# Patient Record
Sex: FEMALE | Race: WHITE | NOT HISPANIC OR LATINO | Employment: FULL TIME | URBAN - METROPOLITAN AREA
[De-identification: names, ages, dates, MRNs, and addresses within clinical notes are randomized per-mention and may not be internally consistent; named-entity substitution may affect disease eponyms.]

---

## 2017-08-24 PROBLEM — N18.30 CKD (CHRONIC KIDNEY DISEASE) STAGE 3, GFR 30-59 ML/MIN: Status: ACTIVE | Noted: 2017-08-24

## 2017-08-28 PROBLEM — N18.2 CKD (CHRONIC KIDNEY DISEASE) STAGE 2, GFR 60-89 ML/MIN: Status: RESOLVED | Noted: 2017-08-28 | Resolved: 2017-08-28

## 2017-08-28 PROBLEM — N18.30 CKD (CHRONIC KIDNEY DISEASE) STAGE 3, GFR 30-59 ML/MIN: Status: RESOLVED | Noted: 2017-08-24 | Resolved: 2017-08-28

## 2017-08-28 PROBLEM — N18.2 CKD (CHRONIC KIDNEY DISEASE) STAGE 2, GFR 60-89 ML/MIN: Status: ACTIVE | Noted: 2017-08-28

## 2017-08-28 PROBLEM — M19.90 OSTEOARTHRITIS: Status: RESOLVED | Noted: 2017-08-28 | Resolved: 2017-08-28

## 2017-08-28 PROBLEM — M19.90 OSTEOARTHRITIS: Status: ACTIVE | Noted: 2017-08-28

## 2017-09-18 PROBLEM — N18.2 CKD (CHRONIC KIDNEY DISEASE) STAGE 2, GFR 60-89 ML/MIN: Status: RESOLVED | Noted: 2017-08-28 | Resolved: 2017-09-18

## 2019-08-22 ENCOUNTER — OFFICE VISIT (OUTPATIENT)
Dept: GASTROENTEROLOGY | Facility: CLINIC | Age: 49
End: 2019-08-22
Payer: COMMERCIAL

## 2019-08-22 VITALS
DIASTOLIC BLOOD PRESSURE: 75 MMHG | WEIGHT: 152.75 LBS | BODY MASS INDEX: 24.66 KG/M2 | SYSTOLIC BLOOD PRESSURE: 118 MMHG | HEART RATE: 82 BPM

## 2019-08-22 DIAGNOSIS — R10.9 ABDOMINAL PAIN, UNSPECIFIED ABDOMINAL LOCATION: Primary | ICD-10-CM

## 2019-08-22 DIAGNOSIS — R14.0 BLOATING: ICD-10-CM

## 2019-08-22 PROCEDURE — 99999 PR PBB SHADOW E&M-EST. PATIENT-LVL III: CPT | Mod: PBBFAC,,, | Performed by: INTERNAL MEDICINE

## 2019-08-22 PROCEDURE — 99999 PR PBB SHADOW E&M-EST. PATIENT-LVL III: ICD-10-PCS | Mod: PBBFAC,,, | Performed by: INTERNAL MEDICINE

## 2019-08-22 PROCEDURE — 99205 OFFICE O/P NEW HI 60 MIN: CPT | Mod: S$GLB,,, | Performed by: INTERNAL MEDICINE

## 2019-08-22 PROCEDURE — 3008F PR BODY MASS INDEX (BMI) DOCUMENTED: ICD-10-PCS | Mod: CPTII,S$GLB,, | Performed by: INTERNAL MEDICINE

## 2019-08-22 PROCEDURE — 3008F BODY MASS INDEX DOCD: CPT | Mod: CPTII,S$GLB,, | Performed by: INTERNAL MEDICINE

## 2019-08-22 PROCEDURE — 99205 PR OFFICE/OUTPT VISIT, NEW, LEVL V, 60-74 MIN: ICD-10-PCS | Mod: S$GLB,,, | Performed by: INTERNAL MEDICINE

## 2019-08-22 RX ORDER — HYOSCYAMINE SULFATE 0.125 MG
125 TABLET ORAL EVERY 6 HOURS PRN
Qty: 90 TABLET | Refills: 6 | Status: SHIPPED | OUTPATIENT
Start: 2019-08-22 | End: 2020-09-25

## 2019-08-22 RX ORDER — SODIUM, POTASSIUM,MAG SULFATES 17.5-3.13G
1 SOLUTION, RECONSTITUTED, ORAL ORAL DAILY
Qty: 1 KIT | Refills: 0 | Status: SHIPPED | OUTPATIENT
Start: 2019-08-22 | End: 2019-08-24

## 2019-08-22 NOTE — PROGRESS NOTES
Subjective:       Patient ID: Emma Plascencia is a 49 y.o. female.    Chief Complaint: Abdominal Pain (left side since May)    For today for evaluation of abdominal pain. Pain is characterized as intermittent left lower quadrant pain that radiates to her left flank.  Is associated with bloating.  It occurs frequently, some weeks once or twice while skipping other weeks.  Seems to occur in the morning or after eating particularly gas causing foods.  May also occur during exercise.  She is evaluated by an outside physician with ultrasound which was reportedly normal.  She denies change in bowel habits however complaint do improve with bowel movement.  Denies blood in her stool.  Denies unexplained weight loss.  Denies family history of colon cancer.      Denies prior surgeries      Past Medical History:   Diagnosis Date    CKD (chronic kidney disease) stage 2, GFR 60-89 ml/min     no kidney stones    Hematuria     Osteoarthritis of knee     Plantar fasciitis     PONV (postoperative nausea and vomiting)     Proteinuria        Past Surgical History:   Procedure Laterality Date    ARTHROSCOPY-MENISCECTOMY partial lateral Right 9/3/2015    [facility] OR    knee Right     3 years ago    KNEE ARTHROSCOPY W/ MENISCAL REPAIR      LIP REPAIR      RECONSTRUCTION, ACL WITH AUTOGRAFT of hamstring Right 9/3/2015    Performed by [facility] at [facility] OR    SHOULDER SURGERY      WISDOM TOOTH EXTRACTION         Social History  Social History     Tobacco Use    Smoking status: Never Smoker    Smokeless tobacco: Never Used   Substance Use Topics    Alcohol use: Yes     Comment: social    Drug use: Yes     Types: Marijuana     Comment: 25 years ago       Family History   Problem Relation Age of Onset    Diabetes Mother     Hypertension Father     Stroke Father        Review of Systems   Constitutional: Negative for chills, fever and unexpected weight change.   HENT:  Negative for congestion and trouble swallowing.    Eyes: Negative for photophobia and visual disturbance.   Respiratory: Negative for cough and shortness of breath.    Cardiovascular: Negative for chest pain and leg swelling.   Gastrointestinal: Positive for abdominal distention and abdominal pain. Negative for blood in stool, constipation, diarrhea, nausea and vomiting.   Genitourinary: Negative for dysuria and hematuria.   Musculoskeletal: Negative for arthralgias and myalgias.   Skin: Negative for color change and rash.   Neurological: Negative for dizziness, light-headedness and numbness.   Psychiatric/Behavioral: Negative for agitation and confusion.           Objective:      Physical Exam   Constitutional: She is oriented to person, place, and time. She appears well-developed and well-nourished.   HENT:   Head: Normocephalic and atraumatic.   Eyes: Pupils are equal, round, and reactive to light. EOM are normal. No scleral icterus.   Neck: Normal range of motion. Neck supple.   Cardiovascular: Normal rate, regular rhythm, normal heart sounds and intact distal pulses.   Pulmonary/Chest: Effort normal and breath sounds normal. No respiratory distress.   Abdominal: Soft. Bowel sounds are normal. She exhibits no distension. There is no tenderness.   Musculoskeletal: Normal range of motion. She exhibits no edema.   Neurological: She is alert and oriented to person, place, and time.   No asterixis   Skin: Skin is warm and dry. No rash noted. No erythema.   Psychiatric: She has a normal mood and affect. Her behavior is normal.   Nursing note and vitals reviewed.        Pertinent labs and imaging studies reviewed    Assessment:       1. Abdominal pain, unspecified abdominal location    2. Bloating        Plan:       Complaints likely functional in origin.  Patient offered colonoscopy however she would like to wait at this time.  I feel like this is reasonable as patient does not have any red flag symptoms that would  make me push for urgent endoscopy   Patient counseled to keep food diary and to avoid potentially inciting foods.  Patient counseled on low FODMAP diet  Trial of Beano.  His symptoms to severe patient provided prescription for Levsin  If no improvement patient is instructed to call clinic and have a colonoscopy scheduled    (Portions of this note were dictated using voice recognition software and may contain dictation related errors in spelling/grammar/syntax not found on text review)

## 2019-12-05 ENCOUNTER — PATIENT MESSAGE (OUTPATIENT)
Dept: GASTROENTEROLOGY | Facility: CLINIC | Age: 49
End: 2019-12-05

## 2019-12-05 ENCOUNTER — TELEPHONE (OUTPATIENT)
Dept: GASTROENTEROLOGY | Facility: CLINIC | Age: 49
End: 2019-12-05

## 2019-12-05 NOTE — TELEPHONE ENCOUNTER
SUPREP Instructions    You are scheduled for a colonoscopy with Dr. Moran  on 1/27/20  at [facility]  To ensure that your test is accurate and complete, you MUST follow these instructions listed below.  If you have any questions, please call our office at [phone].  Plan on being at the hospital for your procedure for 3-4 hours.    1.  Follow a CLEAR LIQUID DIET for the entire day before your scheduled colonoscopy.  This means no solid food the entire day starting when you wake.  You may have as much of the clear liquids as you want throughout the day.   CLEAR LIQUID DIET:   - Avoid Red, Orange, Purple, and/or Blue food coloring   - NO DAIRY   - You can have:  Coffee with sugar (no creamer), tea, water, soda, apple or white grape juice, chicken or beef broth/bouillon (no meat, noodles, or veggies), green/yellow popsicles, green/yellow Jell-O, lemonade.    2.  AT 5 pm the evening before your colonoscopy, POUR ONE (1) BOTTLE OF SUPREP INTO THE MIXING CONTAINER, PROVIDED INSIDE THE BOX.  ADD WATER TO THE LINE ON THE CONTAINER AND MIX IT WELL.  DRINK THE ENTIRE CONTAINER AND THEN DRINK TWO (2) MORE CONTAINERS OF WATER OVER THE NEXT 1 HOUR.  This is sometimes easier to drink if this solution is cold, so you can mix the solution 20 minutes ahead of time and place in the refrigerator prior to drinking.  You have to drink the solution within 30-45 minutes of mixing it.  Do NOT put this solution over ice.  It IS ok to drink with a straw.    3.  The endoscopy department will call you 1 day before your colonoscopy to tell you the exact time to arrive, AND to tell you the exact time to drink the 2nd portion of your prep (which will be FIVE HOURS BEFORE YOUR ARRIVAL TIME).  At this time given to you, POUR ONE (1) BOTTLE OF SUPREP INTO THE MIXING CONTAINER, PROVIDED INSIDE THE BOX.  ADD WATER TO THE LINE ON THE CONTAINER AND MIX IT WELL.  DRINK THE ENTIRE CONTAINER AND THEN DRINK TWO (2) MORE CONTAINERS OF WATER OVER  THE NEXT 1 HOUR.  This is sometimes easier to drink if this solution is cold, so you can mix the solution 20 minutes ahead of time and place in the refrigerator prior to drinking.  You have to drink the solution within 30-45 minutes of mixing it.  Do NOT put this solution over ice.  It IS ok to drink with a straw.  Once this is complete, you may not have ANYTHING else by mouth!    4.  You must have someone with you to DRIVE YOU HOME since you will be receiving IV sedation for the colonoscopy.    5.  It is ok to take your heart, blood pressure, and seizure medications in the morning of your test with a SIP of water.  Hold other medications until after your procedure.  Do NOT have anything else to eat or drink the morning of your colonoscopy.  It is ok to brush your teeth.    6.  If you are on blood thinners THAT YOU HAVE BEEN INSTRUCTED TO HOLD BY YOUR DOCTOR FOR THIS PROCEDURE, then do NOT take this the morning of your colonoscopy.  Do NOT stop these medications on your own, they must be approved to be held by your doctor.  Your colonoscopy can NOT be done if you are on these medications.  Examples of blood thinners include: Coumadin, Aggrenox, Plavix, Pradaxa, Reapro, Pletal, Xarelto, Ticagrelor, Brilinta, Eliquis, and high dose aspirin (325 mg).  You do not have to stop baby aspirin 81 mg.    7.  IF YOU ARE DIABETIC:  NO INSULIN OR ORAL MEDICATIONS THE MORNING OF THE COLONOSCOPY.  TAKE ONLY HALF THE DOSE OF YOUR INSULIN THE DAY BEFORE THE COLONOSCOPY.  DO NOT TAKE ANY ORAL DIABETIC MEDICATIONS THE DAY BEFORE THE COLONOSCOPY.  IF YOU ARE AN INSULIN DEPENDENT DIABETIC WITH UNSTABLE BLOOD SUGARS, NOTIFY YOUR PRIMARY CARE PHYSICIAN FOR INSTRUCTIONS.

## 2020-01-23 ENCOUNTER — TELEPHONE (OUTPATIENT)
Dept: ENDOSCOPY | Facility: HOSPITAL | Age: 50
End: 2020-01-23

## 2020-01-23 NOTE — TELEPHONE ENCOUNTER
Left message instructing patient to call dept @ [phone] between 8am-4pm.    Arrival time to be given @ 11:30 *  (Message sent via [facility] portal)

## 2020-01-23 NOTE — TELEPHONE ENCOUNTER
Spoke with patient about arrival time @1130.     Prep instructions reviewed: the day before the procedure, follow a clear liquid diet all day, then start the first 1/2 of prep at 5pm and take 2nd 1/2 of prep @0630.  Pt must be completely NPO when prep completed @0830.              Medications: Do not take Insulin or oral diabetic medications the day of the procedure.  Take as prescribed: heart, seizure and blood pressure medication in the morning with a sip of water (less than an ounce).  Take any breathing medications and bring inhalers to hospital with you Leave all valuables and jewelry at home.     Wear comfortable clothes to procedure to change into hospital gown You cannot drive for 24 hours after your procedure because you will receive sedation for your procedure to make you comfortable.  A ride must be provided at discharge.

## 2020-01-27 ENCOUNTER — HOSPITAL ENCOUNTER (OUTPATIENT)
Facility: HOSPITAL | Age: 50
Discharge: HOME | End: 2020-01-27
Attending: INTERNAL MEDICINE | Admitting: INTERNAL MEDICINE
Payer: COMMERCIAL

## 2020-01-27 ENCOUNTER — ANESTHESIA (OUTPATIENT)
Dept: ENDOSCOPY | Facility: HOSPITAL | Age: 50
End: 2020-01-27
Payer: COMMERCIAL

## 2020-01-27 ENCOUNTER — ANESTHESIA EVENT (OUTPATIENT)
Dept: ENDOSCOPY | Facility: HOSPITAL | Age: 50
End: 2020-01-27
Payer: COMMERCIAL

## 2020-01-27 DIAGNOSIS — Z12.11 COLON CANCER SCREENING: Primary | ICD-10-CM

## 2020-01-27 PROCEDURE — G0121 COLON CA SCRN NOT HI RSK IND: ICD-10-PCS | Mod: ,,, | Performed by: INTERNAL MEDICINE

## 2020-01-27 PROCEDURE — 37000009 HC ANESTHESIA EA ADD 15 MINS: Performed by: INTERNAL MEDICINE

## 2020-01-27 PROCEDURE — 63600175 PHARM REV CODE 636 W HCPCS: Performed by: NURSE ANESTHETIST, CERTIFIED REGISTERED

## 2020-01-27 PROCEDURE — G0121 COLON CA SCRN NOT HI RSK IND: HCPCS | Performed by: INTERNAL MEDICINE

## 2020-01-27 PROCEDURE — 37000008 HC ANESTHESIA 1ST 15 MINUTES: Performed by: INTERNAL MEDICINE

## 2020-01-27 PROCEDURE — G0121 COLON CA SCRN NOT HI RSK IND: HCPCS | Mod: ,,, | Performed by: INTERNAL MEDICINE

## 2020-01-27 PROCEDURE — 63600175 PHARM REV CODE 636 W HCPCS: Performed by: INTERNAL MEDICINE

## 2020-01-27 RX ORDER — SODIUM CHLORIDE 0.9 % (FLUSH) 0.9 %
10 SYRINGE (ML) INJECTION
Status: DISCONTINUED | OUTPATIENT
Start: 2020-01-27 | End: 2020-01-27 | Stop reason: HOSPADM

## 2020-01-27 RX ORDER — SODIUM CHLORIDE 9 MG/ML
INJECTION, SOLUTION INTRAVENOUS CONTINUOUS
Status: DISCONTINUED | OUTPATIENT
Start: 2020-01-27 | End: 2020-01-27 | Stop reason: HOSPADM

## 2020-01-27 RX ORDER — PROPOFOL 10 MG/ML
VIAL (ML) INTRAVENOUS CONTINUOUS PRN
Status: DISCONTINUED | OUTPATIENT
Start: 2020-01-27 | End: 2020-01-27

## 2020-01-27 RX ORDER — LIDOCAINE HCL/PF 100 MG/5ML
SYRINGE (ML) INTRAVENOUS
Status: DISCONTINUED | OUTPATIENT
Start: 2020-01-27 | End: 2020-01-27

## 2020-01-27 RX ORDER — SODIUM CHLORIDE 9 MG/ML
INJECTION, SOLUTION INTRAVENOUS CONTINUOUS PRN
Status: DISCONTINUED | OUTPATIENT
Start: 2020-01-27 | End: 2020-01-27

## 2020-01-27 RX ORDER — PROPOFOL 10 MG/ML
VIAL (ML) INTRAVENOUS
Status: DISCONTINUED | OUTPATIENT
Start: 2020-01-27 | End: 2020-01-27

## 2020-01-27 RX ADMIN — PROPOFOL 150 MCG/KG/MIN: 10 INJECTION, EMULSION INTRAVENOUS at 12:01

## 2020-01-27 RX ADMIN — SODIUM CHLORIDE: 9 INJECTION, SOLUTION INTRAVENOUS at 12:01

## 2020-01-27 RX ADMIN — SODIUM CHLORIDE: 0.9 INJECTION, SOLUTION INTRAVENOUS at 11:01

## 2020-01-27 RX ADMIN — PROPOFOL 50 MG: 10 INJECTION, EMULSION INTRAVENOUS at 12:01

## 2020-01-27 RX ADMIN — Medication 50 MG: at 12:01

## 2020-01-27 NOTE — DISCHARGE INSTRUCTIONS
4 Steps for Eating Healthier  Changing the way you eat can improve your health. It can lower your cholesterol and blood pressure, and help you stay at a healthy weight. Your diet doesnt have to be bland and boring to be healthy. Just watch your calories and follow these steps:    1. Eat fewer unhealthy fats  · Choose more fish and lean meats instead of fatty cuts of meat.  · Skip butter and lard, and use less margarine.  · Pass on foods that have palm, coconut, or hydrogenated oils.  · Eat fewer high-fat dairy foods like cheese, ice cream, and whole milk.  · Get a heart-healthy cookbook and try some low-fat recipes.  2. Go light on salt  · Keep the saltshaker off the table.  · Limit high-salt ingredients, such as soy sauce, bouillon, and garlic salt.  · Instead of adding salt when cooking, season your food with herbs and flavorings. Try lemon, garlic, and onion.  · Limit convenience foods, such as boxed or canned foods and restaurant food.  · Read food labels and choose lower-sodium options.  3. Limit sugar  · Pause before you add sugars to pancakes, cereal, coffee, or tea. This includes white and brown table sugar, syrup, honey, and molasses. Cut your usual amount by half.  · Use non-sugar sweeteners. Stevia, aspartame, and sucralose can satisfy a sweet tooth without adding calories.  · Swap out sugar-filled soda and other drinks. Buy sugar-free or low-calorie beverages. Remember water is always the best choice.  · Read labels and choose foods with less added sugar. Keep in mind that dairy foods and foods with fruit will have some natural sugar.  · Cut the sugar in recipes by 1/3 to 1/2. Boost the flavor with extracts like almond, vanilla, or orange. Or add spices such as cinnamon or nutmeg.  4. Eat more fiber  · Eat fresh fruits and vegetables every day.  · Boost your diet with whole grains. Go for oats, whole-grain rice, and bran.  · Add beans and lentils to your meals.  · Drink more water to match your fiber  increase. This is to help prevent constipation.  Date Last Reviewed: 5/11/2015  © 1737-6708 [employer] [address] [place] [address], [place] [address]. All rights reserved. This information is not intended as a substitute for professional medical care. Always follow your healthcare professional's instructions.

## 2020-01-27 NOTE — TRANSFER OF CARE
Anesthesia Transfer of Care Note    Patient: Emma Plascencia    Procedure(s) Performed: Procedure(s) (LRB):  COLONOSCOPY/suprep (N/A)    Patient location: GI    Anesthesia Type: MAC    Transport from OR: Transported from OR on room air with adequate spontaneous ventilation    Post pain: adequate analgesia    Post assessment: no apparent anesthetic complications and tolerated procedure well    Post vital signs: stable    Level of consciousness: awake, alert and oriented    Nausea/Vomiting: no nausea/vomiting    Complications: none    Transfer of care protocol was followed      Last vitals:   Visit Vitals  Breastfeeding? No

## 2020-01-27 NOTE — ANESTHESIA POSTPROCEDURE EVALUATION
Anesthesia Post Evaluation    Patient: Emma Plascencia    Procedure(s) Performed: Procedure(s) (LRB):  COLONOSCOPY/suprep (N/A)    Final Anesthesia Type: MAC    Patient location during evaluation: GI PACU  Patient participation: Yes- Able to Participate  Level of consciousness: awake and alert and oriented  Post-procedure vital signs: reviewed and stable  Pain management: adequate  Airway patency: patent    PONV status at discharge: No PONV  Anesthetic complications: no      Cardiovascular status: blood pressure returned to baseline and hemodynamically stable  Respiratory status: unassisted, spontaneous ventilation and room air  Hydration status: euvolemic  Follow-up not needed.          Vitals Value Taken Time   /78 1/27/2020 12:55 PM   Temp 37.7 1/27/2020 12:55 PM   Pulse 84 1/27/2020 12:55 PM   Resp 16 1/27/2020 12:55 PM   SpO2 100 1/27/2020 12:55 PM         No case tracking events are documented in the log.      Pain/Dax Score: No data recorded

## 2020-01-27 NOTE — ANESTHESIA PREPROCEDURE EVALUATION
01/27/2020  Emma Plascencia is a 50 y.o., female here for c-scope under mac    PSH  Shoulder  Lip (dog bite)  Knee  [place] teeth    Allergies      Anesthesia Evaluation      I have reviewed the Medications.     Review of Systems  Anesthesia Hx:  Hx of Anesthetic complications History of prior surgery of interest to airway management or planning: Personal Hx of Anesthesia complications, Post-Operative Nausea/Vomiting   Social:  Non-Smoker, No Alcohol Use    Hematology/Oncology:  Hematology Normal   Oncology Normal     EENT/Dental:  EENT/Dental Normal Upper front crown  Back crown  No nosebleeds   Cardiovascular:  Cardiovascular Normal  Denies Hypertension.     Pulmonary:  Pulmonary Normal    Renal/:   Chronic Renal Disease, CRI    Hepatic/GI:  Hepatic/GI Normal  Denies GERD.    Neurological:  Neurology Normal    Endocrine:  Endocrine Normal       Wt Readings from Last 3 Encounters:   08/22/19 69.3 kg (152 lb 12.5 oz)   01/15/18 71.7 kg (158 lb)   09/18/17 70.8 kg (156 lb)     Temp Readings from Last 3 Encounters:   01/15/18 36.8 °C (98.2 °F) (Oral)   09/18/17 36.9 °C (98.5 °F)   08/28/17 (!) 20.2 °C (68.3 °F)     BP Readings from Last 3 Encounters:   08/22/19 118/75   01/15/18 122/64   09/18/17 100/62     Pulse Readings from Last 3 Encounters:   08/22/19 82   01/15/18 71   09/18/17 68       Physical Exam  General:  Well nourished    Airway/Jaw/Neck:  AIRWAY FINDINGS: Normal       Dental:  DENTAL FINDINGS: Normal   Chest/Lungs:  Chest/Lungs Clear    Heart/Vascular:  Heart Findings: Normal Heart murmur: negative       Mental Status:  Mental Status Findings: Normal           Anesthesia Plan  Type of Anesthesia, risks & benefits discussed:  Anesthesia Type:  MAC  Patient's Preference:   Intra-op Monitoring Plan: standard ASA monitors  Intra-op Monitoring Plan Comments:   Post Op Pain Control Plan: per  primary service following discharge from PACU  Post Op Pain Control Plan Comments:   Induction:   IV  Beta Blocker:  Patient is not currently on a Beta-Blocker (No further documentation required).       Informed Consent: Patient understands risks and agrees with Anesthesia plan.  Questions answered. Anesthesia consent signed with patient.  ASA Score: 2     Day of Surgery Review of History & Physical: I have interviewed and examined the patient. I have reviewed the patient's H&P dated:    H&P update referred to the surgeon.         Ready For Surgery From Anesthesia Perspective.

## 2020-01-27 NOTE — [employer] PATIENT INSTRUCTIONS
Discharge Summary/Instructions after an Endoscopic Procedure  Patient Name: Emma Plascencia  Patient MRN: [id number]  Patient YOB: 1970 Monday, January 27, 2020  Baljit Moran MD  RESTRICTIONS:  During your procedure today, you received medications for sedation.  These   medications may affect your judgment, balance and coordination.  Therefore,   for 24 hours, you have the following restrictions:   - DO NOT drive a car, operate machinery, make legal/financial decisions,   sign important papers or drink alcohol.    ACTIVITY:  Today: no heavy lifting, straining or running due to procedural   sedation/anesthesia.  The following day: return to full activity including work.  DIET:  Eat and drink normally unless instructed otherwise.     TREATMENT FOR COMMON SIDE EFFECTS:  - Mild abdominal pain, nausea, belching, bloating or excessive gas:  rest,   eat lightly and use a heating pad.  - Sore Throat: treat with throat lozenges and/or gargle with warm salt   water.  - Because air was used during the procedure, expelling large amounts of air   from your rectum or belching is normal.  - If a bowel prep was taken, you may not have a bowel movement for 1-3 days.    This is normal.  SYMPTOMS TO WATCH FOR AND REPORT TO YOUR PHYSICIAN:  1. Abdominal pain or bloating, other than gas cramps.  2. Chest pain.  3. Back pain.  4. Signs of infection such as: chills or fever occurring within 24 hours   after the procedure.  5. Rectal bleeding, which would show as bright red, maroon, or black stools.   (A tablespoon of blood from the rectum is not serious, especially if   hemorrhoids are present.)  6. Vomiting.  7. Weakness or dizziness.  GO DIRECTLY TO THE NEAREST EMERGENCY ROOM IF YOU HAVE ANY OF THE FOLLOWING:      Difficulty breathing              Chills and/or fever over 101 F   Persistent vomiting and/or vomiting blood   Severe abdominal pain   Severe chest pain   Black, tarry stools   Bleeding- more than one  tablespoon   Any other symptom or condition that you feel may need urgent attention  Your doctor recommends these additional instructions:  If any biopsies were taken, your doctors clinic will contact you in 1 to 2   weeks with any results.  - Discharge patient to home.   - Resume previous diet.   - Continue present medications.   - Repeat colonoscopy in 10 years for screening purposes.   - Patient has a contact number available for emergencies.  The signs and   symptoms of potential delayed complications were discussed with the   patient.  Return to normal activities tomorrow.  Written discharge   instructions were provided to the patient.  For questions, problems or results please call your physician - Baljit Moran MD at Work:  [phone].  EMERGENCY PHONE NUMBER: [phone],  LAB RESULTS: [phone]  IF A COMPLICATION OR EMERGENCY SITUATION ARISES AND YOU ARE UNABLE TO REACH   YOUR PHYSICIAN - GO DIRECTLY TO THE EMERGENCY ROOM.  Baljit Moran MD  1/27/2020 12:52:56 PM  This report has been verified and signed electronically.  [employer]

## 2020-01-27 NOTE — H&P
Short Stay Endoscopy History and Physical    PCP - Ryland Palomo NP    Procedure - Colonoscopy  ASA - II  Mallampati - per anesthesia  History of Anesthesia problems - no  Family history Anesthesia problems - no     HPI:  This is a 50 y.o. female here for evaluation of : CRC screen      ROS:  Constitutional: No fevers, chills, No weight loss  ENT: No allergies  CV: No chest pain  Pulm: No shortness of breath  GI: see HPI  Derm: No rash    Medical History:  has a past medical history of CKD (chronic kidney disease) stage 2, GFR 60-89 ml/min, Hematuria, Osteoarthritis of knee, Plantar fasciitis, PONV (postoperative nausea and vomiting), and Proteinuria.    Surgical History:  has a past surgical history that includes Shoulder surgery; Lip repair; Knee arthroscopy w/ meniscal repair; [place] tooth extraction; and knee (Right).    Family History: family history includes Diabetes in her mother; Hypertension in her father; Stroke in her father.. Otherwise no colon cancer, inflammatory bowel disease, or GI malignancies.    Social History:  reports that she has never smoked. She has never used smokeless tobacco. She reports that she drinks alcohol. She reports that she has current or past drug history. Drug: Marijuana.    Review of patient's allergies indicates:  No Known Allergies    Medications:   Medications Prior to Admission   Medication Sig Dispense Refill Last Dose    MICROGESTIN 1/20, 21, 1-20 mg-mcg per tablet 1 mg.   1/27/2020 at Unknown time    cetirizine 10 mg chewable tablet Take 10 mg by mouth once daily.   Unknown at Unknown time    hyoscyamine (ANASPAZ,LEVSIN) 0.125 mg Tab Take 1 tablet (125 mcg total) by mouth every 6 (six) hours as needed. 90 tablet 6 Unknown at Unknown time         Objective Findings:    Vital Signs: see nursing notes  Physical Exam:  General Appearance: Well appearing in no acute distress  Eyes:    No scleral icterus  ENT: Neck supple  Lungs: CTA anteriorly  Heart:  S1, S2 normal, no  murmurs heard  Abdomen: Soft, non tender, non distended with positive bowel sounds. No hepatosplenomegaly, ascites, or mass  Extremities: no edema  Skin: No rash      Labs:  No results found for: WBC, HGB, HCT, PLT, CHOL, TRIG, HDL, LDLDIRECT, ALT, AST, NA, K, CL, CREATININE, BUN, CO2, TSH, PSA, INR, GLUF, HGBA1C, MICROALBUR    I have explained the risks and benefits of endoscopy procedures to the patient including but not limited to bleeding, perforation, infection, and death.    Baljit Moran MD

## 2020-01-28 VITALS
SYSTOLIC BLOOD PRESSURE: 115 MMHG | DIASTOLIC BLOOD PRESSURE: 67 MMHG | RESPIRATION RATE: 18 BRPM | HEART RATE: 80 BPM | OXYGEN SATURATION: 100 % | TEMPERATURE: 98 F

## 2020-09-11 ENCOUNTER — OFFICE VISIT (OUTPATIENT)
Dept: UROLOGY | Facility: CLINIC | Age: 50
End: 2020-09-11
Payer: COMMERCIAL

## 2020-09-11 VITALS
HEIGHT: 64 IN | BODY MASS INDEX: 26.24 KG/M2 | SYSTOLIC BLOOD PRESSURE: 133 MMHG | WEIGHT: 153.69 LBS | TEMPERATURE: 98 F | DIASTOLIC BLOOD PRESSURE: 74 MMHG | HEART RATE: 61 BPM

## 2020-09-11 DIAGNOSIS — R31.29 MICROSCOPIC HEMATURIA: Primary | ICD-10-CM

## 2020-09-11 PROCEDURE — 3008F PR BODY MASS INDEX (BMI) DOCUMENTED: ICD-10-PCS | Mod: CPTII,S$GLB,, | Performed by: STUDENT IN AN ORGANIZED HEALTH CARE EDUCATION/TRAINING PROGRAM

## 2020-09-11 PROCEDURE — 3008F BODY MASS INDEX DOCD: CPT | Mod: CPTII,S$GLB,, | Performed by: STUDENT IN AN ORGANIZED HEALTH CARE EDUCATION/TRAINING PROGRAM

## 2020-09-11 PROCEDURE — 99999 PR PBB SHADOW E&M-EST. PATIENT-LVL III: CPT | Mod: PBBFAC,,, | Performed by: STUDENT IN AN ORGANIZED HEALTH CARE EDUCATION/TRAINING PROGRAM

## 2020-09-11 PROCEDURE — 99204 OFFICE O/P NEW MOD 45 MIN: CPT | Mod: S$GLB,,, | Performed by: STUDENT IN AN ORGANIZED HEALTH CARE EDUCATION/TRAINING PROGRAM

## 2020-09-11 PROCEDURE — 99204 PR OFFICE/OUTPT VISIT, NEW, LEVL IV, 45-59 MIN: ICD-10-PCS | Mod: S$GLB,,, | Performed by: STUDENT IN AN ORGANIZED HEALTH CARE EDUCATION/TRAINING PROGRAM

## 2020-09-11 PROCEDURE — 99999 PR PBB SHADOW E&M-EST. PATIENT-LVL III: ICD-10-PCS | Mod: PBBFAC,,, | Performed by: STUDENT IN AN ORGANIZED HEALTH CARE EDUCATION/TRAINING PROGRAM

## 2020-09-11 NOTE — PROGRESS NOTES
"Subjective:       Patient ID: Emma Plascencia is a 50 y.o. female.    Chief Complaint: No chief complaint on file.   ***  This is a 50 y.o.  female patient that is {Blank single:23894::"new to me.","new to me but not new to the system.","an established patient of mine."}  The patient is {Blank single:97776::"self referred","referred to me by"} *** for ***.         LAST PSA  No results found for: PSA, PSADIAG, PSATOTAL, PSAFREE    No results found for: CREATININE    I personally reviewed the images: ***  No results found in the last 24 hours.      ---  Past Medical History:   Diagnosis Date    CKD (chronic kidney disease) stage 2, GFR 60-89 ml/min     no kidney stones    Hematuria     Osteoarthritis of knee     Plantar fasciitis     PONV (postoperative nausea and vomiting)     Proteinuria        Past Surgical History:   Procedure Laterality Date    COLONOSCOPY N/A 1/27/2020    Procedure: COLONOSCOPY/suprep;  Surgeon: Baljit Moran MD;  Location: [facility];  Service: Endoscopy;  Laterality: N/A;    knee Right     3 years ago    KNEE ARTHROSCOPY W/ MENISCAL REPAIR      LIP REPAIR      SHOULDER SURGERY      WISDOM TOOTH EXTRACTION         Family History   Problem Relation Age of Onset    Diabetes Mother     Hypertension Father     Stroke Father        Social History     Tobacco Use    Smoking status: Never Smoker    Smokeless tobacco: Never Used   Substance Use Topics    Alcohol use: Yes     Comment: social    Drug use: Yes     Types: Marijuana     Comment: 25 years ago       Current Outpatient Medications on File Prior to Visit   Medication Sig Dispense Refill    cetirizine 10 mg chewable tablet Take 10 mg by mouth once daily.      hyoscyamine (ANASPAZ,LEVSIN) 0.125 mg Tab Take 1 tablet (125 mcg total) by mouth every 6 (six) hours as needed. 90 tablet 6    MICROGESTIN 1/20, 21, 1-20 mg-mcg per tablet 1 mg.       No current facility-administered medications on file prior to " visit.        Review of patient's allergies indicates:  No Known Allergies    Review of Systems    Objective:      Physical Exam    Assessment:       No diagnosis found.    Plan:               There are no diagnoses linked to this encounter.

## 2020-09-11 NOTE — PROGRESS NOTES
Subjective:       Patient ID: Emma Plascencia is a 50 y.o. female.    Chief Complaint: Hematuria  This is a 50 y.o.  female patient that is new to me.  she is self referred to me for microscopic hematuria.    The patient did not experience gross hematuria. She states her PCP keeps telling her to get this worked up. She has been to a nephrologist who reportedly did an ultrasound and a 24 hour urine and found no abnormalities. I do not have her nephrology records. She is understandably frustrated about having to go to another specialist and getting a workup.  The patient does not take blood thinners.   she does not have a history of stones.   The patient does not have a history of smoking.       She brought outside records with urinalysis history below:  9/11/17 0 RBCs  11/15/17 0-2 RGBCs  12/19/17 0-2 RBCs  2/26/18 3-10 RBCs  4/3/18 3-10 RBCs  8/2/18 3-10 RBCs  7/12/19 3-10 RBCs  8/12/20 3-10 RBCs   ---  Past Medical History:   Diagnosis Date    CKD (chronic kidney disease) stage 2, GFR 60-89 ml/min     no kidney stones    Hematuria     Osteoarthritis of knee     Plantar fasciitis     PONV (postoperative nausea and vomiting)     Proteinuria        Past Surgical History:   Procedure Laterality Date    COLONOSCOPY N/A 1/27/2020    Procedure: COLONOSCOPY/suprep;  Surgeon: Baljit Moran MD;  Location: [facility];  Service: Endoscopy;  Laterality: N/A;    knee Right     3 years ago    KNEE ARTHROSCOPY W/ MENISCAL REPAIR      LIP REPAIR      SHOULDER SURGERY      WISDOM TOOTH EXTRACTION         Family History   Problem Relation Age of Onset    Diabetes Mother     Hypertension Father     Stroke Father        Social History     Tobacco Use    Smoking status: Never Smoker    Smokeless tobacco: Never Used   Substance Use Topics    Alcohol use: Yes     Comment: social    Drug use: Yes     Types: Marijuana     Comment: 25 years ago       Current Outpatient Medications on File Prior to Visit    Medication Sig Dispense Refill    cetirizine 10 mg chewable tablet Take 10 mg by mouth once daily.      hyoscyamine (ANASPAZ,LEVSIN) 0.125 mg Tab Take 1 tablet (125 mcg total) by mouth every 6 (six) hours as needed. 90 tablet 6    MICROGESTIN 1/20, 21, 1-20 mg-mcg per tablet 1 mg.       No current facility-administered medications on file prior to visit.        Review of patient's allergies indicates:  No Known Allergies    Review of Systems   Constitutional: Negative for activity change.   HENT: Negative for congestion.    Eyes: Negative for visual disturbance.   Respiratory: Negative for shortness of breath.    Cardiovascular: Negative for chest pain.   Gastrointestinal: Negative for abdominal distention.   Musculoskeletal: Negative for gait problem.   Skin: Negative for color change.   Neurological: Negative for dizziness.   Psychiatric/Behavioral: Negative for agitation.       Objective:      Physical Exam  Constitutional:       Appearance: She is well-developed.   HENT:      Head: Normocephalic and atraumatic.   Neck:      Musculoskeletal: Normal range of motion.   Pulmonary:      Effort: Pulmonary effort is normal.   Musculoskeletal: Normal range of motion.   Skin:     General: Skin is warm and dry.   Neurological:      Mental Status: She is alert and oriented to person, place, and time.         Assessment:       1. Microscopic hematuria        Plan:        1. The patient has microscopic hematuria  I counseled the patient on the American Urology Guidelines for a hematuria workup.  The criteria for microscopic hematuria is 3 red blood cells on microscopic urinalysis and the workup is recommended for any patient experiencing gross hematuria. The workup includes a CT urogram and a flexible cystoscopy performed here in the clinic. After answering all of the questions about the workup the patient was not amenable in proceeding with the CT urogram. She would only like the cystoscopy.     2. Schedule cystoscopy.      Microscopic hematuria  -     Cystoscopy; Future; Expected date: 09/11/2020

## 2020-09-25 ENCOUNTER — PROCEDURE VISIT (OUTPATIENT)
Dept: UROLOGY | Facility: CLINIC | Age: 50
End: 2020-09-25
Payer: COMMERCIAL

## 2020-09-25 VITALS — SYSTOLIC BLOOD PRESSURE: 128 MMHG | HEART RATE: 70 BPM | TEMPERATURE: 98 F | DIASTOLIC BLOOD PRESSURE: 79 MMHG

## 2020-09-25 DIAGNOSIS — R31.29 MICROSCOPIC HEMATURIA: ICD-10-CM

## 2020-09-25 PROCEDURE — 52000 PR CYSTOURETHROSCOPY: ICD-10-PCS | Mod: S$GLB,,, | Performed by: STUDENT IN AN ORGANIZED HEALTH CARE EDUCATION/TRAINING PROGRAM

## 2020-09-25 PROCEDURE — 52000 CYSTOURETHROSCOPY: CPT | Mod: S$GLB,,, | Performed by: STUDENT IN AN ORGANIZED HEALTH CARE EDUCATION/TRAINING PROGRAM

## 2020-09-25 NOTE — PROCEDURES
Procedures   Procedure:   1. Flexible cysto-uretheroscopy    Pre Procedure Diagnosis:  1. Microscopic hematuria    Post Procedure Diagnosis:  1. Microscopic hematuria    Surgeon: Aixa Vasquez MD    Anesthesia: 2% uro-jet lidocaine jelly for local analgesia    Procedure note:  A flexible cysto-urethroscopy was performed after consent was obtained.  The risks and benefits were explained. 2% lidocaine urojet was used for local analgesia. The genitalia was prepped and draped in the sterile fashion.   Right before inserting the cystoscope, I noted an area of fleshy growth emanating from the anterior vaginal wall. When I palpated the anterior vaginal wall I do not feel any obvious fullness.     The flexible scope was advanced into the urethra and into the bladder. The bilateral ureteral orifices were identified in their normal orthotopic locations. Clear bilateral ureteral efflux was identified. The bladder was completely surveyed in a systematic fashion. No bladder tumors or lesions were seen.     As the flexible cystoscope was being removed, the urethra was evaluated and noted to be normal.     The patient tolerated the procedure well without complication.       Findings in summary:  No bladder mucosal abnormalities or urethral abnormalities seen.  Abnormal growth/tissue and mucosa seen from anterior vaginal wall - showed patient with cystoscope where it was located (externally in the anterior vaginal wall)     Plan:  1. In terms of the microscopic hematuria workup, the pt reports benign upper tract kidney imaging and the cystoscopy was benign. Therefore no additional workup needed for microscopic hematuria.  2. Due to abnormal findings in anterior vaginal wall, I recommended consultation with an ob/gyn. My suspicion is that likely they would recommend a biopsy of that anterior vaginal wall to rule out any malignancy.

## 2021-04-16 ENCOUNTER — PATIENT MESSAGE (OUTPATIENT)
Dept: RESEARCH | Facility: HOSPITAL | Age: 51
End: 2021-04-16

## 2023-09-19 ENCOUNTER — OFFICE VISIT (OUTPATIENT)
Dept: OBSTETRICS AND GYNECOLOGY | Facility: CLINIC | Age: 53
End: 2023-09-19
Payer: COMMERCIAL

## 2023-09-19 VITALS
BODY MASS INDEX: 27.85 KG/M2 | SYSTOLIC BLOOD PRESSURE: 138 MMHG | DIASTOLIC BLOOD PRESSURE: 79 MMHG | WEIGHT: 162.25 LBS

## 2023-09-19 DIAGNOSIS — Z01.419 WELL WOMAN EXAM WITH ROUTINE GYNECOLOGICAL EXAM: Primary | ICD-10-CM

## 2023-09-19 PROCEDURE — 3075F SYST BP GE 130 - 139MM HG: CPT | Mod: CPTII,S$GLB,, | Performed by: OBSTETRICS & GYNECOLOGY

## 2023-09-19 PROCEDURE — 1160F PR REVIEW ALL MEDS BY PRESCRIBER/CLIN PHARMACIST DOCUMENTED: ICD-10-PCS | Mod: CPTII,S$GLB,, | Performed by: OBSTETRICS & GYNECOLOGY

## 2023-09-19 PROCEDURE — 1159F PR MEDICATION LIST DOCUMENTED IN MEDICAL RECORD: ICD-10-PCS | Mod: CPTII,S$GLB,, | Performed by: OBSTETRICS & GYNECOLOGY

## 2023-09-19 PROCEDURE — 3008F PR BODY MASS INDEX (BMI) DOCUMENTED: ICD-10-PCS | Mod: CPTII,S$GLB,, | Performed by: OBSTETRICS & GYNECOLOGY

## 2023-09-19 PROCEDURE — 3075F PR MOST RECENT SYSTOLIC BLOOD PRESS GE 130-139MM HG: ICD-10-PCS | Mod: CPTII,S$GLB,, | Performed by: OBSTETRICS & GYNECOLOGY

## 2023-09-19 PROCEDURE — 99999 PR PBB SHADOW E&M-EST. PATIENT-LVL III: CPT | Mod: PBBFAC,,, | Performed by: OBSTETRICS & GYNECOLOGY

## 2023-09-19 PROCEDURE — 1160F RVW MEDS BY RX/DR IN RCRD: CPT | Mod: CPTII,S$GLB,, | Performed by: OBSTETRICS & GYNECOLOGY

## 2023-09-19 PROCEDURE — 3008F BODY MASS INDEX DOCD: CPT | Mod: CPTII,S$GLB,, | Performed by: OBSTETRICS & GYNECOLOGY

## 2023-09-19 PROCEDURE — 1159F MED LIST DOCD IN RCRD: CPT | Mod: CPTII,S$GLB,, | Performed by: OBSTETRICS & GYNECOLOGY

## 2023-09-19 PROCEDURE — 3078F PR MOST RECENT DIASTOLIC BLOOD PRESSURE < 80 MM HG: ICD-10-PCS | Mod: CPTII,S$GLB,, | Performed by: OBSTETRICS & GYNECOLOGY

## 2023-09-19 PROCEDURE — 99999 PR PBB SHADOW E&M-EST. PATIENT-LVL III: ICD-10-PCS | Mod: PBBFAC,,, | Performed by: OBSTETRICS & GYNECOLOGY

## 2023-09-19 PROCEDURE — 99386 PREV VISIT NEW AGE 40-64: CPT | Mod: S$GLB,,, | Performed by: OBSTETRICS & GYNECOLOGY

## 2023-09-19 PROCEDURE — 3078F DIAST BP <80 MM HG: CPT | Mod: CPTII,S$GLB,, | Performed by: OBSTETRICS & GYNECOLOGY

## 2023-09-19 PROCEDURE — 99386 PR PREVENTIVE VISIT,NEW,40-64: ICD-10-PCS | Mod: S$GLB,,, | Performed by: OBSTETRICS & GYNECOLOGY

## 2023-09-19 PROCEDURE — 88175 CYTOPATH C/V AUTO FLUID REDO: CPT | Performed by: OBSTETRICS & GYNECOLOGY

## 2023-09-19 RX ORDER — ESTAZOLAM 2 MG/1
1 TABLET ORAL DAILY
Qty: 21 TABLET | Refills: 14 | Status: SHIPPED | OUTPATIENT
Start: 2023-09-19 | End: 2023-11-05

## 2023-09-19 NOTE — PROGRESS NOTES
HPI:   53 y.o.   OB History    No obstetric history on file.      No LMP recorded (lmp unknown). (Menstrual status: Birth Control).    Patient is  here for her annual gynecologic exam.  She has no complaints.     ROS:  GENERAL: No fever, chills, fatigability or weight loss.  SKIN: No rashes, itching or changes in color or texture of skin.  HEAD: No headaches or recent head trauma.  EYES: Visual acuity fine. No photophobia, ocular pain or diplopia.  EARS: Denies ear pain, discharge or vertigo.  NOSE: No loss of smell, no epistaxis or postnasal drip.  MOUTH & THROAT: No hoarseness or change in voice. No excessive gum bleeding.  NODES: Denies swollen glands.  CHEST: Denies GLASGOW, cyanosis, wheezing, cough and sputum production.  CARDIOVASCULAR: Denies chest pain, PND, orthopnea or reduced exercise tolerance.  ABDOMEN: Appetite fine. No weight loss. Denies diarrhea, abdominal pain, hematemesis or blood in stool.  URINARY: No flank pain, dysuria or hematuria.  PERIPHERAL VASCULAR: No claudication or cyanosis.  MUSCULOSKELETAL: No joint stiffness or swelling. Denies back pain.  NEUROLOGIC: No history of seizures, paralysis, alteration of gait or coordination.    PE:   /79   Wt 73.6 kg (162 lb 4.1 oz)   LMP  (LMP Unknown)   BMI 27.85 kg/m²   APPEARANCE: Well nourished, well developed, in no acute distress.  NECK: Neck symmetric without masses or thyromegaly.  BREASTS: Symmetrical, no skin changes or visible lesions. No palpable masses, nipple discharge or adenopathy bilaterally.  ABDOMEN: Flat. Soft. No tenderness or masses. No hepatosplenomegaly. No hernias. No CVA tenderness.  VULVA: No lesions. Normal female genitalia.  URETHRAL MEATUS: Normal size and location, no lesions, no prolapse.  URETHRA: No masses, tenderness, prolapse or scarring.  VAGINA: Moist and well rugated, no discharge, no significant cystocele or rectocele.  CERVIX: No lesions and discharge. PAP done.  UTERUS: Normal size, regular shape, mobile,  non-tender, bladder base nontender.  ADNEXA: No masses, tenderness or CDS nodularity.  ANUS PERINEUM: Normal.    PROCEDURES:  Pap smear    Assessment:  Normal Gynecologic Exam    Plan:  Mammogram and Colonoscopy if indicated by current recommendations.  Return to clinic in one year or for any problems or complaints.  On ocp  Discussed,no med issues, wants to stay on

## 2023-09-26 LAB
FINAL PATHOLOGIC DIAGNOSIS: NORMAL
Lab: NORMAL

## 2023-09-29 ENCOUNTER — HOSPITAL ENCOUNTER (OUTPATIENT)
Dept: RADIOLOGY | Facility: HOSPITAL | Age: 53
Discharge: HOME | End: 2023-09-29
Attending: OBSTETRICS & GYNECOLOGY
Payer: COMMERCIAL

## 2023-09-29 DIAGNOSIS — Z01.419 WELL WOMAN EXAM WITH ROUTINE GYNECOLOGICAL EXAM: ICD-10-CM

## 2023-09-29 PROCEDURE — 77067 SCR MAMMO BI INCL CAD: CPT | Mod: 26,,, | Performed by: RADIOLOGY

## 2023-09-29 PROCEDURE — 77063 BREAST TOMOSYNTHESIS BI: CPT | Mod: 26,,, | Performed by: RADIOLOGY

## 2023-09-29 PROCEDURE — 77067 SCR MAMMO BI INCL CAD: CPT | Mod: TC

## 2023-09-29 PROCEDURE — 77067 MAMMO DIGITAL SCREENING BILAT WITH TOMO: ICD-10-PCS | Mod: 26,,, | Performed by: RADIOLOGY

## 2023-09-29 PROCEDURE — 77063 MAMMO DIGITAL SCREENING BILAT WITH TOMO: ICD-10-PCS | Mod: 26,,, | Performed by: RADIOLOGY

## 2023-10-10 ENCOUNTER — HOSPITAL ENCOUNTER (OUTPATIENT)
Dept: RADIOLOGY | Facility: HOSPITAL | Age: 53
Discharge: HOME | End: 2023-10-10
Attending: OBSTETRICS & GYNECOLOGY
Payer: COMMERCIAL

## 2023-10-10 DIAGNOSIS — R92.8 ABNORMAL MAMMOGRAM: ICD-10-CM

## 2023-10-10 PROCEDURE — 76642 ULTRASOUND BREAST LIMITED: CPT | Mod: 26,RT,, | Performed by: RADIOLOGY

## 2023-10-10 PROCEDURE — 76642 ULTRASOUND BREAST LIMITED: CPT | Mod: TC,RT

## 2023-10-10 PROCEDURE — 76642 US BREAST RIGHT LIMITED: ICD-10-PCS | Mod: 26,RT,, | Performed by: RADIOLOGY

## 2023-11-04 NOTE — TELEPHONE ENCOUNTER
Refill Routing Note   Medication(s) are not appropriate for processing by [facility] for the following reason(s):      New or recently adjusted medication    [facility] action(s):  Defer Care Due:  None identified     Medication Therapy Plan: New start (9/19/23); Defer      Appointments  past 12m or future 3m with PCP    Date Provider   Last Visit   9/19/2023 Wiedemann, Michael A., MD   Next Visit   Visit date not found Wiedemann, Michael A., MD   ED visits in past 90 days: 0        Note composed:10:25 PM 11/03/2023

## 2023-11-05 RX ORDER — NORETHINDRONE ACETATE AND ETHINYL ESTRADIOL .02; 1 MG/1; MG/1
1 TABLET ORAL
Qty: 84 TABLET | Refills: 3 | Status: SHIPPED | OUTPATIENT
Start: 2023-11-05

## 2023-11-06 ENCOUNTER — PATIENT MESSAGE (OUTPATIENT)
Dept: OBSTETRICS AND GYNECOLOGY | Facility: CLINIC | Age: 53
End: 2023-11-06
Payer: COMMERCIAL

## 2024-04-02 ENCOUNTER — PATIENT MESSAGE (OUTPATIENT)
Dept: OBSTETRICS AND GYNECOLOGY | Facility: CLINIC | Age: 54
End: 2024-04-02
Payer: COMMERCIAL

## 2024-04-02 NOTE — TELEPHONE ENCOUNTER
Pt is out of birth control and needs a continuous prescription as she cannot get a refill until 4/21.

## 2024-04-03 ENCOUNTER — PATIENT MESSAGE (OUTPATIENT)
Dept: OBSTETRICS AND GYNECOLOGY | Facility: CLINIC | Age: 54
End: 2024-04-03
Payer: COMMERCIAL

## 2024-04-03 RX ORDER — NORETHINDRONE ACETATE AND ETHINYL ESTRADIOL .02; 1 MG/1; MG/1
1 TABLET ORAL DAILY
Qty: 21 TABLET | Refills: 5 | Status: SHIPPED | OUTPATIENT
Start: 2024-04-03 | End: 2025-04-03

## 2024-08-08 ENCOUNTER — PATIENT MESSAGE (OUTPATIENT)
Dept: OBSTETRICS AND GYNECOLOGY | Facility: CLINIC | Age: 54
End: 2024-08-08
Payer: COMMERCIAL

## 2024-10-05 RX ORDER — NORETHINDRONE ACETATE AND ETHINYL ESTRADIOL .02; 1 MG/1; MG/1
1 TABLET ORAL
Qty: 84 TABLET | Refills: 0 | Status: SHIPPED | OUTPATIENT
Start: 2024-10-05

## 2024-10-06 NOTE — TELEPHONE ENCOUNTER
Refill Decision Note   Emma Thais  is requesting a refill authorization.  Brief Assessment and Rationale for Refill:  Approve     Medication Therapy Plan:       Medication Reconciliation Completed: No   Comments:     No Care Gaps recommended.     Note composed:9:34 PM 10/05/2024

## 2024-12-10 ENCOUNTER — OFFICE VISIT (OUTPATIENT)
Dept: OBSTETRICS AND GYNECOLOGY | Facility: CLINIC | Age: 54
End: 2024-12-10
Payer: COMMERCIAL

## 2024-12-10 ENCOUNTER — PATIENT MESSAGE (OUTPATIENT)
Dept: OBSTETRICS AND GYNECOLOGY | Facility: CLINIC | Age: 54
End: 2024-12-10

## 2024-12-10 VITALS
DIASTOLIC BLOOD PRESSURE: 83 MMHG | SYSTOLIC BLOOD PRESSURE: 124 MMHG | WEIGHT: 161.94 LBS | BODY MASS INDEX: 27.79 KG/M2

## 2024-12-10 DIAGNOSIS — Z12.39 ENCOUNTER FOR SCREENING FOR MALIGNANT NEOPLASM OF BREAST, UNSPECIFIED SCREENING MODALITY: ICD-10-CM

## 2024-12-10 DIAGNOSIS — Z01.419 WELL WOMAN EXAM WITH ROUTINE GYNECOLOGICAL EXAM: Primary | ICD-10-CM

## 2024-12-10 PROCEDURE — 99396 PREV VISIT EST AGE 40-64: CPT | Mod: S$GLB,,, | Performed by: OBSTETRICS & GYNECOLOGY

## 2024-12-10 PROCEDURE — 88175 CYTOPATH C/V AUTO FLUID REDO: CPT | Performed by: OBSTETRICS & GYNECOLOGY

## 2024-12-10 PROCEDURE — 3008F BODY MASS INDEX DOCD: CPT | Mod: CPTII,S$GLB,, | Performed by: OBSTETRICS & GYNECOLOGY

## 2024-12-10 PROCEDURE — 3079F DIAST BP 80-89 MM HG: CPT | Mod: CPTII,S$GLB,, | Performed by: OBSTETRICS & GYNECOLOGY

## 2024-12-10 PROCEDURE — 1159F MED LIST DOCD IN RCRD: CPT | Mod: CPTII,S$GLB,, | Performed by: OBSTETRICS & GYNECOLOGY

## 2024-12-10 PROCEDURE — 3074F SYST BP LT 130 MM HG: CPT | Mod: CPTII,S$GLB,, | Performed by: OBSTETRICS & GYNECOLOGY

## 2024-12-10 PROCEDURE — 99999 PR PBB SHADOW E&M-EST. PATIENT-LVL III: CPT | Mod: PBBFAC,,, | Performed by: OBSTETRICS & GYNECOLOGY

## 2024-12-10 RX ORDER — NORETHINDRONE ACETATE AND ETHINYL ESTRADIOL .02; 1 MG/1; MG/1
1 TABLET ORAL DAILY
Qty: 21 TABLET | Refills: 11 | Status: SHIPPED | OUTPATIENT
Start: 2024-12-10 | End: 2025-12-10

## 2024-12-10 RX ORDER — NORETHINDRONE ACETATE AND ETHINYL ESTRADIOL .02; 1 MG/1; MG/1
1 TABLET ORAL DAILY
Qty: 63 TABLET | Refills: 3 | Status: SHIPPED | OUTPATIENT
Start: 2024-12-10 | End: 2025-12-10

## 2024-12-10 NOTE — PROGRESS NOTES
HPI:   54 y.o.   OB History          1    Para   1    Term   1            AB        Living             SAB        IAB        Ectopic        Multiple        Live Births                  No LMP recorded. (Menstrual status: Birth Control).    Patient is  here for her annual gynecologic exam.  She has no complaints.     ROS:  GENERAL: No fever, chills, fatigability or weight loss.  SKIN: No rashes, itching or changes in color or texture of skin.  HEAD: No headaches or recent head trauma.  EYES: Visual acuity fine. No photophobia, ocular pain or diplopia.  EARS: Denies ear pain, discharge or vertigo.  NOSE: No loss of smell, no epistaxis or postnasal drip.  MOUTH & THROAT: No hoarseness or change in voice. No excessive gum bleeding.  NODES: Denies swollen glands.  CHEST: Denies GLASGOW, cyanosis, wheezing, cough and sputum production.  CARDIOVASCULAR: Denies chest pain, PND, orthopnea or reduced exercise tolerance.  ABDOMEN: Appetite fine. No weight loss. Denies diarrhea, abdominal pain, hematemesis or blood in stool.  URINARY: No flank pain, dysuria or hematuria.  PERIPHERAL VASCULAR: No claudication or cyanosis.  MUSCULOSKELETAL: No joint stiffness or swelling. Denies back pain.  NEUROLOGIC: No history of seizures, paralysis, alteration of gait or coordination.    PE:   /83   Wt 73.5 kg (161 lb 14.9 oz)   BMI 27.79 kg/m²   APPEARANCE: Well nourished, well developed, in no acute distress.  NECK: Neck symmetric without masses or thyromegaly.  BREASTS: Symmetrical, no skin changes or visible lesions. No palpable masses, nipple discharge or adenopathy bilaterally.  ABDOMEN: Flat. Soft. No tenderness or masses. No hepatosplenomegaly. No hernias. No CVA tenderness.  VULVA: No lesions. Normal female genitalia.  URETHRAL MEATUS: Normal size and location, no lesions, no prolapse.  URETHRA: No masses, tenderness, prolapse or scarring.  VAGINA: Moist and well rugated, no discharge, no significant cystocele or  rectocele.  CERVIX: No lesions and discharge. PAP done.  UTERUS: Normal size, regular shape, mobile, non-tender, bladder base nontender.  ADNEXA: No masses, tenderness or CDS nodularity.  ANUS PERINEUM: Normal.    PROCEDURES:  Pap smear    Assessment:  Normal Gynecologic Exam    Plan:  Mammogram and Colonoscopy if indicated by current recommendations.  Return to clinic in one year or for any problems or complaints.  Disc wt, issue

## 2024-12-11 RX ORDER — NORETHINDRONE ACETATE AND ETHINYL ESTRADIOL .02; 1 MG/1; MG/1
1 TABLET ORAL DAILY
Qty: 84 TABLET | Refills: 3 | Status: SHIPPED | OUTPATIENT
Start: 2024-12-11 | End: 2025-12-11

## 2024-12-13 LAB
CLINICAL INFO: NORMAL
CYTO CVX: NORMAL
CYTOLOGIST CVX/VAG CYTO: NORMAL
CYTOLOGIST CVX/VAG CYTO: NORMAL
CYTOLOGY CMNT CVX/VAG CYTO-IMP: NORMAL
CYTOLOGY PAP THIN PREP EXPLANATION: NORMAL
DATE OF PREVIOUS PAP: NORMAL
DATE PREVIOUS BX: NORMAL
GEN CATEG CVX/VAG CYTO-IMP: NORMAL
LMP START DATE: NORMAL
MICROORGANISM CVX/VAG CYTO: NORMAL
PATHOLOGIST CVX/VAG CYTO: NORMAL
SERVICE CMNT-IMP: NORMAL
SPECIMEN SOURCE CVX/VAG CYTO: NORMAL
STAT OF ADQ CVX/VAG CYTO-IMP: NORMAL

## 2025-05-02 ENCOUNTER — HOSPITAL ENCOUNTER (OUTPATIENT)
Dept: RADIOLOGY | Facility: HOSPITAL | Age: 55
Discharge: HOME | End: 2025-05-02
Attending: OBSTETRICS & GYNECOLOGY
Payer: COMMERCIAL

## 2025-05-02 DIAGNOSIS — Z12.39 ENCOUNTER FOR SCREENING FOR MALIGNANT NEOPLASM OF BREAST, UNSPECIFIED SCREENING MODALITY: ICD-10-CM

## 2025-05-02 PROCEDURE — 77063 BREAST TOMOSYNTHESIS BI: CPT | Mod: 26,,, | Performed by: RADIOLOGY

## 2025-05-02 PROCEDURE — 77067 SCR MAMMO BI INCL CAD: CPT | Mod: TC

## 2025-05-02 PROCEDURE — 77067 SCR MAMMO BI INCL CAD: CPT | Mod: 26,,, | Performed by: RADIOLOGY
